# Patient Record
Sex: MALE | Race: BLACK OR AFRICAN AMERICAN | Employment: UNEMPLOYED | URBAN - METROPOLITAN AREA
[De-identification: names, ages, dates, MRNs, and addresses within clinical notes are randomized per-mention and may not be internally consistent; named-entity substitution may affect disease eponyms.]

---

## 2018-06-17 ENCOUNTER — HOSPITAL ENCOUNTER (EMERGENCY)
Facility: HOSPITAL | Age: 28
Discharge: HOME | End: 2018-06-18
Attending: EMERGENCY MEDICINE | Admitting: EMERGENCY MEDICINE

## 2018-06-17 DIAGNOSIS — T78.40XA ALLERGIC REACTION, INITIAL ENCOUNTER: Primary | ICD-10-CM

## 2018-06-17 PROCEDURE — 96372 THER/PROPH/DIAG INJ SC/IM: CPT

## 2018-06-17 PROCEDURE — 96374 THER/PROPH/DIAG INJ IV PUSH: CPT

## 2018-06-17 PROCEDURE — 96361 HYDRATE IV INFUSION ADD-ON: CPT

## 2018-06-17 PROCEDURE — 96375 TX/PRO/DX INJ NEW DRUG ADDON: CPT

## 2018-06-17 RX ORDER — EPINEPHRINE 0.3 MG/.3ML
0.3 INJECTION SUBCUTANEOUS ONCE
Qty: 0.3 ML | Refills: 0 | Status: SHIPPED | OUTPATIENT
Start: 2018-06-17 | End: 2018-06-17

## 2018-06-17 RX ORDER — DIPHENHYDRAMINE HYDROCHLORIDE 50 MG/ML
25 INJECTION INTRAMUSCULAR; INTRAVENOUS ONCE
Status: COMPLETED | OUTPATIENT
Start: 2018-06-17 | End: 2018-06-17

## 2018-06-17 RX ORDER — PREDNISONE 20 MG/1
60 TABLET ORAL DAILY
Qty: 15 TABLET | Refills: 0 | Status: SHIPPED | OUTPATIENT
Start: 2018-06-17

## 2018-06-17 RX ORDER — EPINEPHRINE 1 MG/ML
0.3 INJECTION, SOLUTION, CONCENTRATE INTRAVENOUS ONCE
Status: COMPLETED | OUTPATIENT
Start: 2018-06-17 | End: 2018-06-17

## 2018-06-17 RX ORDER — METHYLPREDNISOLONE SODIUM SUCCINATE 125 MG/2ML
125 INJECTION, POWDER, LYOPHILIZED, FOR SOLUTION INTRAMUSCULAR; INTRAVENOUS ONCE
Status: COMPLETED | OUTPATIENT
Start: 2018-06-17 | End: 2018-06-17

## 2018-06-17 RX ADMIN — METHYLPREDNISOLONE SODIUM SUCCINATE 125 MG: 125 INJECTION, POWDER, FOR SOLUTION INTRAMUSCULAR; INTRAVENOUS at 22:20

## 2018-06-17 RX ADMIN — FAMOTIDINE 20 MG: 10 INJECTION, SOLUTION INTRAVENOUS at 22:26

## 2018-06-17 RX ADMIN — DIPHENHYDRAMINE HYDROCHLORIDE 25 MG: 50 INJECTION, SOLUTION INTRAMUSCULAR; INTRAVENOUS at 22:11

## 2018-06-17 RX ADMIN — SODIUM CHLORIDE 1000 ML: 0.9 INJECTION, SOLUTION INTRAVENOUS at 22:26

## 2018-06-17 RX ADMIN — EPINEPHRINE 0.3 MG: 1 INJECTION, SOLUTION, CONCENTRATE INTRAVENOUS at 23:41

## 2018-06-18 VITALS
DIASTOLIC BLOOD PRESSURE: 66 MMHG | TEMPERATURE: 98.8 F | OXYGEN SATURATION: 96 % | WEIGHT: 177.47 LBS | HEIGHT: 70 IN | BODY MASS INDEX: 25.41 KG/M2 | HEART RATE: 70 BPM | RESPIRATION RATE: 12 BRPM | SYSTOLIC BLOOD PRESSURE: 115 MMHG

## 2018-06-18 PROCEDURE — 99283 EMERGENCY DEPT VISIT LOW MDM: CPT

## 2018-06-18 NOTE — DISCHARGE INSTRUCTIONS
Take the Prednisone, 3 pills daily, starting tomorrow  Fill the prescription for the epipen, if you start to notice swelling in the tongue or throat, or worsening shortness of breath or wheezing, use the pen as instructed and return to the ER immediately  General Allergic Reaction   WHAT YOU NEED TO KNOW:   An allergic reaction is your body's response to an allergen  Allergens include medicines, food, insect stings, animal dander, mold, latex, chemicals, and dust mites  Pollen from trees, grass, and weeds can also cause an allergic reaction  DISCHARGE INSTRUCTIONS:   Return to the emergency department if:   · You have a skin rash, hives, swelling, or itching that gets worse  · You have trouble breathing, shortness of breath, wheezing, or coughing  · Your throat tightens, or your lips or tongue swell  · You have trouble swallowing or speaking  · You have dizziness, lightheadedness, fainting, or confusion  · You have nausea, vomiting, diarrhea, or abdominal cramps  · You have chest pain or tightness  Contact your healthcare provider if:   · You have questions or concerns about your condition or care  Medicines:   · Medicines  may be given to relieve certain allergy symptoms such as itching, sneezing, and swelling  You may take them as a pill or use drops in your nose or eyes  Topical treatments may be given to put directly on your skin to help decrease itching or swelling  · Take your medicine as directed  Contact your healthcare provider if you think your medicine is not helping or if you have side effects  Tell him of her if you are allergic to any medicine  Keep a list of the medicines, vitamins, and herbs you take  Include the amounts, and when and why you take them  Bring the list or the pill bottles to follow-up visits  Carry your medicine list with you in case of an emergency    Follow up with your healthcare provider as directed:  Write down your questions so you remember to ask them during your visits  Self-care:   · Avoid the allergen  that you think may have caused your allergic reaction  · Use cold compresses  on your skin or eyes if they were affected by the allergic reaction  Cold compresses may help to soothe your skin or eyes  · Rinse your nasal passages  with a saline solution  Daily rinsing may help clear your nose of allergens  · Do not smoke  Your allergy symptoms may decrease if you are not around smoke  Nicotine and other chemicals in cigarettes and cigars can also cause lung damage  Ask your healthcare provider for information if you currently smoke and need help to quit  E-cigarettes or smokeless tobacco still contain nicotine  Talk to your healthcare provider before you use these products  © 2017 [facility] Information is for End User's use only and may not be sold, redistributed or otherwise used for commercial purposes  All illustrations and images included in CareNotes® are the copyrighted property of [employer] , [employer]  or Adam Rodriguez  The above information is an  only  It is not intended as medical advice for individual conditions or treatments  Talk to your doctor, nurse or pharmacist before following any medical regimen to see if it is safe and effective for you

## 2018-06-18 NOTE — ED NOTES
Patient states that he is no longer short of breath but does feel nauseas  Patient ambulated to the bathroom at this time with no difficulty       Johnny Torres RN  06/17/18 1310

## 2018-06-18 NOTE — ED PROVIDER NOTES
History  Chief Complaint   Patient presents with    Allergic Reaction     patient is allergic to peanuts and ate food with peanut unknowingly about 30 mins PTA; patient c/o of SOB and hives noted in triage;     35 YO male presents with allergic reaction  Pt states he ate something that he did not realize had peanuts in it ~45 minutes PTA  He began to have rapid onset hives and itching diffusely, states some mild SOB, denies swelling in the throat, tongue, no wheezing  Pt states known allergy to peanuts  States symptoms do seem to be improving since onset  Pt denies CP/SOB/F/C/N/V/D/C, no dysuria, burning on urination or blood in urine  History provided by:  Patient   used: No    Allergic Reaction   Presenting symptoms: difficulty breathing, itching and rash    Presenting symptoms: no wheezing    Difficulty breathing:     Severity:  Mild    Onset quality:  Gradual    Duration:  1 hour    Timing:  Constant    Progression:  Improving  Itching:     Location:  Full body    Severity:  Moderate    Onset quality:  Gradual    Duration:  1 hour    Timing:  Constant    Progression:  Unchanged  Rash:     Location:  Full body    Quality: itchiness      Severity:  Moderate    Onset quality:  Gradual    Duration:  1 hour    Timing:  Constant  Severity:  Moderate  Duration:  1 hour  Prior allergic episodes:  Food/nut allergies  Context: food    Relieved by:  Nothing  Worsened by:  Nothing  Ineffective treatments:  None tried      None       Past Medical History:   Diagnosis Date    Asthma        History reviewed  No pertinent surgical history  History reviewed  No pertinent family history  I have reviewed and agree with the history as documented  Social History   Substance Use Topics    Smoking status: Never Smoker    Smokeless tobacco: Never Used    Alcohol use No        Review of Systems   Constitutional: Negative for fever  HENT: Negative for dental problem      Eyes: Negative for visual disturbance  Respiratory: Negative for shortness of breath and wheezing  Cardiovascular: Negative for chest pain  Gastrointestinal: Negative for abdominal pain, nausea and vomiting  Genitourinary: Negative for dysuria and frequency  Musculoskeletal: Negative for neck pain and neck stiffness  Skin: Positive for itching and rash  Neurological: Negative for dizziness, weakness and light-headedness  Psychiatric/Behavioral: Negative for agitation, behavioral problems and confusion  All other systems reviewed and are negative  Physical Exam  Physical Exam   Constitutional: He is oriented to person, place, and time  He appears well-developed and well-nourished  HENT:   Head: Normocephalic and atraumatic  Mouth/Throat: Oropharynx is clear and moist    Eyes: EOM are normal  Pupils are equal, round, and reactive to light  Neck: Normal range of motion  Cardiovascular: Normal rate, regular rhythm and normal heart sounds  Pulmonary/Chest: Effort normal and breath sounds normal    Abdominal: Soft  Musculoskeletal: Normal range of motion  Neurological: He is alert and oriented to person, place, and time  Skin: Skin is warm and dry  Hives diffusely  Psychiatric: He has a normal mood and affect  His behavior is normal    Nursing note and vitals reviewed        Vital Signs  ED Triage Vitals   Temperature Pulse Respirations Blood Pressure SpO2   06/17/18 2200 06/17/18 2200 06/17/18 2200 06/17/18 2200 06/17/18 2200   98 8 °F (37 1 °C) (!) 121 (!) 24 155/85 95 %      Temp Source Heart Rate Source Patient Position - Orthostatic VS BP Location FiO2 (%)   06/17/18 2200 06/17/18 2200 06/17/18 2200 06/17/18 2200 --   Temporal Monitor Sitting Right arm       Pain Score       06/17/18 2245       No Pain           Vitals:    06/17/18 2245 06/18/18 0036 06/18/18 0215 06/18/18 0345   BP: 138/84 123/81 122/71 115/66   Pulse: 91 81 74 70   Patient Position - Orthostatic VS: Sitting Sitting Lying Lying       Visual Acuity      ED Medications  Medications   EPINEPHrine PF (ADRENALIN) 1 mg/mL injection 0 3 mg (0 3 mg Intramuscular Given 6/17/18 2341)   famotidine (PEPCID) injection 20 mg (20 mg Intravenous Given 6/17/18 2226)   methylPREDNISolone sodium succinate (Solu-MEDROL) injection 125 mg (125 mg Intravenous Given 6/17/18 2220)   sodium chloride 0 9 % bolus 1,000 mL (0 mL Intravenous Stopped 6/18/18 0006)   diphenhydrAMINE (BENADRYL) injection 25 mg (25 mg Intravenous Given 6/17/18 2211)       Diagnostic Studies  Results Reviewed     None                 No orders to display              Procedures  CriticalCare Time  Performed by: Joey Valles by: Jose Daniel MOODY     Critical care provider statement:     Critical care time (minutes):  35    Critical care time was exclusive of:  Separately billable procedures and treating other patients and teaching time    Critical care was necessary to treat or prevent imminent or life-threatening deterioration of the following conditions: Anaphylaxis  Critical care was time spent personally by me on the following activities:  Blood draw for specimens, obtaining history from patient or surrogate, development of treatment plan with patient or surrogate, discussions with consultants, evaluation of patient's response to treatment, examination of patient, interpretation of cardiac output measurements, ordering and performing treatments and interventions, ordering and review of laboratory studies, ordering and review of radiographic studies, re-evaluation of patient's condition and review of old charts  Comments:      Pt initially with improving shortness of breath  After 1 5 hours of observation pt notes symptoms worsening, feels swelling in throat, objective swelling to lower lips  Epinephrine administered and pt required observation for clinical deterioration             Phone Contacts  ED Phone Contact    ED Course                               MDM  Number of Diagnoses or Management Options  Allergic reaction, initial encounter: new and requires workup  Diagnosis management comments: 1  Allergic reaction - Pt with diffuse hives, complains of mild SOB with no wheezing, states this is improving  Explained desire to give epinephrine and monitor which the pt has initially declined due to improving symptoms  Will give solumedrol, pepcid, benadryl and fluids, monitor  Patient Progress  Patient progress: stable    CritCare Time    Disposition  Final diagnoses: Allergic reaction, initial encounter     Time reflects when diagnosis was documented in both MDM as applicable and the Disposition within this note     Time User Action Codes Description Comment    6/17/2018 11:12 PM Hawa Baird 61 Allergic reaction, initial encounter       ED Disposition     ED Disposition Condition Comment    Discharge  Marie Kuhn discharge to home/self care  Condition at discharge: Good        Follow-up Information    None         Discharge Medication List as of 6/17/2018 11:15 PM      START taking these medications    Details   EPINEPHrine (EPIPEN) 0 3 mg/0 3 mL SOAJ Inject 0 3 mL (0 3 mg total) into the shoulder, thigh, or buttocks once for 1 dose, Starting Sun 6/17/2018, Print      predniSONE 20 mg tablet Take 3 tablets (60 mg total) by mouth daily, Starting Sun 6/17/2018, Print           No discharge procedures on file      ED Provider  Electronically Signed by           Martina Ruiz MD  06/18/18 6099

## 2018-06-18 NOTE — ED CARE HANDOFF
Emergency Department Sign Out Note        Sign out and transfer of care from Dr Veleta Cooks  See Separate Emergency Department note  The patient, Miguelina Mcarthur, was evaluated by the previous provider for allergic reaction  Workup Completed:  Medications given    ED Course / Workup Pending (followup): Re-evaluation    12:01 AM  Patient just started complaining of some throat tightening and lower lip swelling  Epinephrine subcutaneously was just given  I re-evaluated the patient now  He is in no acute distress  Lungs are clear  Heart rate is regular rate and rhythm  Lower lip is slightly swollen  No other signs of facial angioedema  Uvula is midline  Palate rises normally  Plan is to continue observation for at least 3 hours and if asymptomatic will discharge home  If any other symptoms developed during his observation  Will admit for observation in the hospital     4:12 AM  Patient is stable  Symptoms have not worsened  Patient is asking to go home  He has been observed for over 3 hours after the epinephrine  He is stable for discharge  Procedures  MDM  CritCare Time      Disposition  Final diagnoses:    Allergic reaction, initial encounter     Time reflects when diagnosis was documented in both MDM as applicable and the Disposition within this note     Time User Action Codes Description Comment    6/17/2018 11:12 PM Ludwig Curtis [[id number]] Allergic reaction, initial encounter       ED Disposition     None      Follow-up Information    None       Patient's Medications   Discharge Prescriptions    EPINEPHRINE (EPIPEN) 0 3 MG/0 3 ML SOAJ    Inject 0 3 mL (0 3 mg total) into the shoulder, thigh, or buttocks once for 1 dose       Start Date: 6/17/2018 End Date: 6/17/2018       Order Dose: 0 3 mg       Quantity: 0 3 mL    Refills: 0    PREDNISONE 20 MG TABLET    Take 3 tablets (60 mg total) by mouth daily       Start Date: 6/17/2018 End Date: --       Order Dose: 60 mg Quantity: 15 tablet    Refills: 0     No discharge procedures on file         ED Provider  Electronically Signed by     Smitha Foreman DO  06/18/18 0412

## 2019-10-02 ENCOUNTER — OFFICE VISIT (OUTPATIENT)
Dept: FAMILY MEDICINE CLINIC | Facility: CLINIC | Age: 29
End: 2019-10-02

## 2019-10-02 VITALS
TEMPERATURE: 98.1 F | DIASTOLIC BLOOD PRESSURE: 64 MMHG | OXYGEN SATURATION: 98 % | HEIGHT: 70 IN | RESPIRATION RATE: 16 BRPM | BODY MASS INDEX: 24.14 KG/M2 | SYSTOLIC BLOOD PRESSURE: 118 MMHG | WEIGHT: 168.6 LBS | HEART RATE: 79 BPM

## 2019-10-02 DIAGNOSIS — Z00.00 HEALTH CARE MAINTENANCE: ICD-10-CM

## 2019-10-02 DIAGNOSIS — Z23 ENCOUNTER FOR IMMUNIZATION: Primary | ICD-10-CM

## 2019-10-02 PROCEDURE — 99385 PREV VISIT NEW AGE 18-39: CPT | Performed by: FAMILY MEDICINE

## 2019-10-02 PROCEDURE — 86580 TB INTRADERMAL TEST: CPT

## 2019-10-02 RX ORDER — MULTIVITAMIN
1 TABLET ORAL DAILY
COMMUNITY

## 2019-10-02 NOTE — PROGRESS NOTES
Chief Complaint   Patient presents with    Well Check     yearly pe for work     Assessment/Plan:Read PPD in 48 hours  Shot records  PHQ-9 Depression Screening    PHQ-9:    Frequency of the following problems over the past two weeks:       Little interest or pleasure in doing things:  0 - not at all  Feeling down, depressed, or hopeless:  0 - not at all  PHQ-2 Score:  0            Diagnoses and all orders for this visit:    Encounter for immunization  -     TB Skin Test    Health care maintenance    Other orders  -     Multiple Vitamin (MULTIVITAMIN) tablet; Take 1 tablet by mouth daily          Subjective:      Patient ID: David Bloom is a 41 y o  male  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 work  From [facility], attended [employer] [facility]  Needs PPD, and will get copy of shot records  The following portions of the patient's history were reviewed and updated as appropriate: allergies, current medications, past family history, past medical history, past social history, past surgical history and problem list     Review of Systems   Constitutional: Negative  HENT: Negative  Eyes: Negative  Respiratory: Negative  Cardiovascular: Negative  Gastrointestinal: Negative  Genitourinary: Negative  Musculoskeletal: Negative  Skin: Negative  Neurological: Negative  Psychiatric/Behavioral: Negative  Objective:      /64 (BP Location: Left arm, Patient Position: Sitting, Cuff Size: Standard)   Pulse 79   Temp 98 1 °F (36 7 °C) (Oral)   Resp 16   Ht 5' 10" (1 778 m)   Wt 76 5 kg (168 lb 9 6 oz)   SpO2 98%   BMI 24 19 kg/m²       Current Outpatient Medications:     Multiple Vitamin (MULTIVITAMIN) tablet, Take 1 tablet by mouth daily, Disp: , Rfl:      Physical Exam   Constitutional: He is oriented to person, place, and time  He appears well-developed and well-nourished  HENT:   Head: Normocephalic and atraumatic     Right Ear: External ear normal    Left Ear: External ear normal    Nose: Nose normal  Mouth/Throat: Oropharynx is clear and moist    Eyes: Pupils are equal, round, and reactive to light  Conjunctivae and EOM are normal    Neck: Normal range of motion  Neck supple  Cardiovascular: Normal rate, regular rhythm, normal heart sounds and intact distal pulses  Pulmonary/Chest: Effort normal and breath sounds normal    Abdominal: Soft  Bowel sounds are normal    Neurological: He is alert and oriented to person, place, and time  He has normal reflexes  Skin: Skin is warm and dry  Psychiatric: He has a normal mood and affect   His behavior is normal  Judgment and thought content normal

## 2019-10-04 ENCOUNTER — CLINICAL SUPPORT (OUTPATIENT)
Dept: FAMILY MEDICINE CLINIC | Facility: CLINIC | Age: 29
End: 2019-10-04

## 2019-10-04 DIAGNOSIS — Z11.1 ENCOUNTER FOR PPD SKIN TEST READING: ICD-10-CM

## 2019-10-04 LAB
INDURATION: 0 MM
TB SKIN TEST: NEGATIVE

## 2019-10-04 NOTE — PROGRESS NOTES
PPD placed on 10/02/2019  PPD read and results on 10/04/2019  Result: 0 0 mm induration    Interpretation: Negative

## 2023-11-21 ENCOUNTER — OFFICE VISIT (OUTPATIENT)
Dept: FAMILY MEDICINE CLINIC | Facility: CLINIC | Age: 33
End: 2023-11-21
Payer: COMMERCIAL

## 2023-11-21 VITALS
HEIGHT: 69 IN | OXYGEN SATURATION: 98 % | SYSTOLIC BLOOD PRESSURE: 124 MMHG | DIASTOLIC BLOOD PRESSURE: 88 MMHG | HEART RATE: 64 BPM | TEMPERATURE: 97.6 F | BODY MASS INDEX: 26.07 KG/M2 | RESPIRATION RATE: 16 BRPM | WEIGHT: 176 LBS

## 2023-11-21 DIAGNOSIS — Z00.00 HEALTHCARE MAINTENANCE: ICD-10-CM

## 2023-11-21 DIAGNOSIS — Z91.010 ALLERGY TO PEANUT OIL: ICD-10-CM

## 2023-11-21 DIAGNOSIS — Z20.2 STD EXPOSURE: ICD-10-CM

## 2023-11-21 DIAGNOSIS — E55.9 VITAMIN D INSUFFICIENCY: ICD-10-CM

## 2023-11-21 DIAGNOSIS — R53.83 OTHER FATIGUE: Primary | ICD-10-CM

## 2023-11-21 PROCEDURE — 99395 PREV VISIT EST AGE 18-39: CPT | Performed by: FAMILY MEDICINE

## 2023-11-21 PROCEDURE — 99204 OFFICE O/P NEW MOD 45 MIN: CPT | Performed by: FAMILY MEDICINE

## 2023-11-21 RX ORDER — EPINEPHRINE 0.3 MG/.3ML
0.3 INJECTION SUBCUTANEOUS ONCE
Qty: 0.6 ML | Refills: 0 | Status: SHIPPED | OUTPATIENT
Start: 2023-11-21 | End: 2023-11-21

## 2023-11-21 NOTE — PROGRESS NOTES
Name: Riri Mas      : 1990      MRN: [id number]  Encounter Provider: Sandra Johnson MD  Encounter Date: 2023   Encounter department: [facility]     1. Other fatigue  Assessment & Plan:  I am going to check blood work including CBC, CMP, TSH and testosterone level. Orders:  -     CBC and differential; Future  -     Comprehensive metabolic panel; Future  -     TSH, 3rd generation with Free T4 reflex; Future  -     Testosterone, free, total; Future    2. Healthcare maintenance  Assessment & Plan: It was discussed about immunizations, diet, exercise and safety measures. Orders:  -     CBC and differential; Future  -     Comprehensive metabolic panel; Future  -     Lipid Panel with Direct LDL reflex; Future  -     TSH, 3rd generation with Free T4 reflex; Future    3. STD exposure  Assessment & Plan:  I am glad to check STD panel. Discussed about safe sex. Orders:  -     HIV 1/2 AB/AG w Reflex [facility] for 2 yr old and above; Future  -     Herpes I/II IgG [facility] w Reflex to HSV-2; Future  -     RPR-Syphilis Screening (Total Syphilis IGG/IGM); Future  -     Hepatitis panel, acute; Future  -     Chlamydia/GC amplified DNA by PCR; Future    4. Vitamin D insufficiency  -     Vitamin D 25 hydroxy; Future    5. Allergy to peanut oil  Assessment & Plan:  He was given prescription for EpiPen. Referral to see allergist.    Orders:  -     EPINEPHrine (EPIPEN) 0.3 mg/0.3 mL SOAJ; Inject 0.3 mL (0.3 mg total) into a muscle once for 1 dose  -     Ambulatory Referral to Allergy; Future      BMI Counseling: Body mass index is 25.99 kg/m². The BMI is above normal. Nutrition recommendations include decreasing portion sizes, encouraging healthy choices of fruits and vegetables and decreasing fast food intake. Rationale for BMI follow-up plan is due to patient being overweight or obese.      Depression Screening and Follow-up Plan: Patient was screened for depression during today's encounter. They screened negative with a PHQ-2 score of 0. Subjective      RW is a 35 yo male with no known PMH, presenting to the office to establish care. Patient requests a full health screening and STD screening. Patient states that he has not had any known STD exposures but would like one just in case. Patient reports being allergic to peanuts and would like a prescription for epi pens. Patient reports no other concerns today. Patient states that his last provider visit was in 2017. Patient reports not getting any immunizations after the mandatory childhood vaccinations and refuses flu and COVID. Patient reports no surgical history. Patient reports working for a [employer] and works many hours. Review of Systems   Constitutional:  Negative for activity change, appetite change, chills, fatigue, fever and unexpected weight change. HENT:  Negative for congestion, rhinorrhea and trouble swallowing. Eyes:  Negative for pain and visual disturbance. Respiratory:  Negative for cough, chest tightness, shortness of breath and wheezing. Cardiovascular:  Negative for chest pain, palpitations and leg swelling. Gastrointestinal:  Negative for abdominal pain, constipation, diarrhea, nausea and vomiting. Endocrine: Negative for cold intolerance, heat intolerance, polydipsia and polyuria. Genitourinary:  Negative for difficulty urinating, dysuria, hematuria, penile pain and testicular pain. Musculoskeletal:  Negative for arthralgias, back pain and myalgias. Skin:  Negative for color change and rash. Allergic/Immunologic: Positive for food allergies (peanuts). Neurological:  Negative for dizziness, tremors, seizures, syncope, weakness, light-headedness, numbness and headaches. Hematological:  Does not bruise/bleed easily. Psychiatric/Behavioral:  Negative for behavioral problems and decreased concentration. The patient is not nervous/anxious.         Current Outpatient Medications on File Prior to Visit   Medication Sig   • EPINEPHrine (EPIPEN) 0.3 mg/0.3 mL SOAJ Inject 0.3 mL (0.3 mg total) into the shoulder, thigh, or buttocks once for 1 dose   • Multiple Vitamin (MULTIVITAMIN) tablet Take 1 tablet by mouth daily (Patient not taking: Reported on 11/21/2023)   • predniSONE 20 mg tablet Take 3 tablets (60 mg total) by mouth daily (Patient not taking: Reported on 11/21/2023)       Objective     /88 (BP Location: Left arm, Patient Position: Sitting, Cuff Size: Standard)   Pulse 64   Temp 97.6 °F (36.4 °C) (Tympanic)   Resp 16   Ht 5' 9" (1.753 m)   Wt 79.8 kg (176 lb)   SpO2 98%   BMI 25.99 kg/m²     Physical Exam  Constitutional:       General: He is not in acute distress. Appearance: Normal appearance. He is not ill-appearing, toxic-appearing or diaphoretic. HENT:      Head: Normocephalic and atraumatic. Right Ear: Tympanic membrane and ear canal normal.      Left Ear: Tympanic membrane and ear canal normal.      Nose: Nose normal.      Mouth/Throat:      Mouth: Mucous membranes are moist.      Pharynx: No oropharyngeal exudate or posterior oropharyngeal erythema. Eyes:      General:         Right eye: No discharge. Left eye: No discharge. Extraocular Movements: Extraocular movements intact. Pupils: Pupils are equal, round, and reactive to light. Cardiovascular:      Rate and Rhythm: Normal rate and regular rhythm. Pulses: Normal pulses. Heart sounds: Normal heart sounds. Pulmonary:      Effort: Pulmonary effort is normal. No respiratory distress. Breath sounds: Normal breath sounds. No wheezing, rhonchi or rales. Abdominal:      General: Bowel sounds are normal. There is no distension. Palpations: Abdomen is soft. There is no mass. Tenderness: There is no abdominal tenderness. There is no guarding or rebound. Musculoskeletal:         General: Normal range of motion.       Cervical back: Normal range of motion and neck supple. No rigidity or tenderness. Skin:     General: Skin is warm. Capillary Refill: Capillary refill takes less than 2 seconds. Neurological:      Mental Status: He is alert and oriented to person, place, and time.    Psychiatric:         Mood and Affect: Mood normal.         Behavior: Behavior normal.       Tawanda Brandon MD

## 2024-01-20 PROBLEM — Z00.00 HEALTHCARE MAINTENANCE: Status: RESOLVED | Noted: 2023-11-21 | Resolved: 2024-01-20

## 2025-03-27 ENCOUNTER — TELEPHONE (OUTPATIENT)
Dept: FAMILY MEDICINE CLINIC | Facility: CLINIC | Age: 35
End: 2025-03-27